# Patient Record
(demographics unavailable — no encounter records)

---

## 2025-02-26 NOTE — ASSESSMENT
[FreeTextEntry1] : PE: left foot focused exam Vasc: DP and PT pulses 2/4. TG: warm to warm. Erythema at dorsal foot and along the attachment of PT tendon. Mild non pitting edema to medial ankle and dorsal foot Derm: no open lesions, no IDM Neuro: protective sensation intact Msk: pain on passive and active inversion and eversion  A: Left foot PT tendonitis Left midfoot pain  P: Patient evaluated, no constitutional symptoms, no current concern for cellulitis May stop Cephalexin Hospital records and imaging reviewed Full time in CAM walker, has at home Rx Medrol dose pack Offload as possible and elevate RTC 1 week fot follow-up, possible repeat Xray or MRI

## 2025-02-26 NOTE — HISTORY OF PRESENT ILLNESS
[FreeTextEntry1] : 42 year old male with PMH of non specific dorsal foot pain for the last 15 years presents to clinic for the first time. No other PMH, takes a multivitamin. Reports CC of a red and swollen left foot to ankle and dorsum for the past 4 days. Patient traveled to [place] for a work conference last week where he was on his feet for 15 hours every day wearing dress shoes. Immediately after the conference his left foot began to bother him and became worse. Patient was at the ED yesterday where he was prescribed Cephalexin, started taking it yesterday, no changes observed yet. Reports no constitutional symptoms. No history of gout. No recent bites. No trauma. No recent cold or flu symptoms. Wears a supportive sneaker with an insert.